# Patient Record
Sex: MALE | Race: WHITE | HISPANIC OR LATINO | Employment: UNEMPLOYED | URBAN - METROPOLITAN AREA
[De-identification: names, ages, dates, MRNs, and addresses within clinical notes are randomized per-mention and may not be internally consistent; named-entity substitution may affect disease eponyms.]

---

## 2023-06-18 PROBLEM — I63.9 ACUTE ISCHEMIC STROKE: Status: ACTIVE | Noted: 2023-06-18

## 2023-06-18 PROBLEM — Z79.4 TYPE 2 DIABETES MELLITUS WITH HYPERGLYCEMIA, WITH LONG-TERM CURRENT USE OF INSULIN: Status: ACTIVE | Noted: 2023-06-18

## 2023-06-18 PROBLEM — E11.65 TYPE 2 DIABETES MELLITUS WITH HYPERGLYCEMIA, WITH LONG-TERM CURRENT USE OF INSULIN: Status: ACTIVE | Noted: 2023-06-18

## 2023-06-18 PROBLEM — I63.9 ACUTE CEREBROVASCULAR ACCIDENT (CVA): Status: ACTIVE | Noted: 2023-06-18

## 2023-06-18 PROBLEM — G81.94 HEMIPARESIS, LEFT: Status: ACTIVE | Noted: 2023-06-18

## 2023-06-18 PROBLEM — I16.1 HYPERTENSIVE EMERGENCY: Status: ACTIVE | Noted: 2023-06-18

## 2023-06-18 PROBLEM — I10 HYPERTENSION: Status: ACTIVE | Noted: 2023-06-18

## 2023-06-20 PROBLEM — R56.9 SEIZURE: Status: ACTIVE | Noted: 2023-06-20

## 2023-06-21 ENCOUNTER — PATIENT OUTREACH (OUTPATIENT)
Dept: ADMINISTRATIVE | Facility: CLINIC | Age: 68
End: 2023-06-21

## 2023-06-21 NOTE — PROGRESS NOTES
C3 nurse attempted to contact Ketan Trejoo via  [facility] for a TCC post hospital discharge follow up call. No answer. Left message on voicemail to return call to Gautam @ [phone].  The patient does not have a scheduled HOSFU appointment, and the pt does not have an [facility] PCP.

## 2023-06-22 NOTE — PROGRESS NOTES
C3 nurse spoke with Ketan Abreu's daughter Rachel for a TCC post hospital discharge follow up call. The patient reports does not have a scheduled HOSFU appointment and does not have a PCP. Reminded pt's daughter to call [facility] for appointment and to attempt to find a PCP for patient while he is visiting from [place].

## 2023-06-22 NOTE — PROGRESS NOTES
2nd attempt to make TCC Call. Left voicemail via  [facility].  Please call [phone] leave your first and last name and date of birth for Gautam. I will return your call.